# Patient Record
Sex: MALE | Race: WHITE | NOT HISPANIC OR LATINO | Employment: FULL TIME | URBAN - NONMETROPOLITAN AREA
[De-identification: names, ages, dates, MRNs, and addresses within clinical notes are randomized per-mention and may not be internally consistent; named-entity substitution may affect disease eponyms.]

---
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
## 2024-01-23 ENCOUNTER — OFFICE VISIT (OUTPATIENT)
Dept: URGENT CARE | Facility: PHYSICIAN GROUP | Age: 19
End: 2024-01-23
Payer: COMMERCIAL

## 2024-01-23 VITALS
TEMPERATURE: 98.3 F | HEART RATE: 58 BPM | BODY MASS INDEX: 20.44 KG/M2 | SYSTOLIC BLOOD PRESSURE: 112 MMHG | WEIGHT: 146 LBS | RESPIRATION RATE: 18 BRPM | HEIGHT: 71 IN | OXYGEN SATURATION: 100 % | DIASTOLIC BLOOD PRESSURE: 56 MMHG

## 2024-01-23 DIAGNOSIS — S09.90XA INJURY OF HEAD, INITIAL ENCOUNTER: ICD-10-CM

## 2024-01-23 DIAGNOSIS — R42 DIZZINESS: ICD-10-CM

## 2024-01-23 DIAGNOSIS — R11.2 NAUSEA AND VOMITING, UNSPECIFIED VOMITING TYPE: ICD-10-CM

## 2024-01-23 PROCEDURE — 99213 OFFICE O/P EST LOW 20 MIN: CPT | Performed by: PHYSICIAN ASSISTANT

## 2024-01-23 PROCEDURE — 3074F SYST BP LT 130 MM HG: CPT | Performed by: PHYSICIAN ASSISTANT

## 2024-01-23 PROCEDURE — 3078F DIAST BP <80 MM HG: CPT | Performed by: PHYSICIAN ASSISTANT

## 2024-01-23 ASSESSMENT — FIBROSIS 4 INDEX: FIB4 SCORE: .19

## 2024-01-23 ASSESSMENT — ENCOUNTER SYMPTOMS
VOMITING: 1
BLURRED VISION: 1
NECK PAIN: 1
HEADACHES: 1
BRUISES/BLEEDS EASILY: 0
NAUSEA: 1
NUMBER OF EPISODES OF EMESIS TODAY: 1
DIZZINESS: 1

## 2024-01-23 ASSESSMENT — VISUAL ACUITY: OU: 1

## 2024-01-23 NOTE — PROGRESS NOTES
"Subjective:   Anish Pascal  is a 18 y.o. male who presents for Emesis and Headache (Snowboarding on Saturday hit back of head, pain in neck and HA, dizziness and blurred vision. started vomiting today, refused ER. Ambulance for now. Mom will take him after work at 330pm )      Emesis  This is a new problem. The current episode started in the past 7 days. Associated symptoms include headaches, nausea, neck pain and vomiting.   Headache  Patient is 4 days status post head injury while snowboarding.  He reports going off a jump and landing flat on his back impacting back of head on the ground.  He states he is not sure if he had loss of consciousness but does not recall events very well.  Patient had significant pain to neck and head, floaters in vision and dizziness over the last 3 to 4 days.  This morning he awoke with nausea and vomiting as well.  He denies paresthesias.  Denies anesthesia or incontinence.  Denies use of blood thinning medications.    Review of Systems   HENT:  Negative for ear discharge.    Eyes:  Positive for blurred vision.   Gastrointestinal:  Positive for nausea and vomiting.   Musculoskeletal:  Positive for neck pain.   Neurological:  Positive for dizziness and headaches.   Endo/Heme/Allergies:  Does not bruise/bleed easily.       Allergies   Allergen Reactions    Nkda [No Known Drug Allergy]         Objective:   /56   Pulse (!) 58   Temp 36.8 °C (98.3 °F) (Temporal)   Resp 18   Ht 1.803 m (5' 11\")   Wt 66.2 kg (146 lb)   SpO2 100%   BMI 20.36 kg/m²     Physical Exam  Vitals and nursing note reviewed.   Constitutional:       General: He is not in acute distress.     Appearance: Normal appearance. He is well-developed. He is not diaphoretic.   HENT:      Head: Normocephalic.      Right Ear: Tympanic membrane and external ear normal.      Left Ear: Tympanic membrane and external ear normal.      Nose: Nose normal.   Eyes:      General: Vision grossly intact. No scleral icterus. "        Right eye: No discharge.         Left eye: No discharge.      Extraocular Movements: Extraocular movements intact.      Conjunctiva/sclera: Conjunctivae normal.      Pupils: Pupils are equal, round, and reactive to light.   Neck:      Trachea: Phonation normal.   Pulmonary:      Effort: Pulmonary effort is normal. No respiratory distress.   Musculoskeletal:      Cervical back: Neck supple. Pain with movement present. Decreased range of motion.   Skin:     General: Skin is warm and dry.      Coloration: Skin is not pale.   Neurological:      Mental Status: He is alert and oriented to person, place, and time.      Cranial Nerves: Cranial nerves 2-12 are intact.      Sensory: Sensation is intact.      Motor: Motor function is intact.      Coordination: Coordination is intact. Romberg sign negative (Slightly unsteady but able to complete test without moving feet). Coordination normal. Finger-Nose-Finger Test and Heel to Shin Test normal.      Gait: Gait is intact. Gait normal.         Assessment/Plan:   1. Injury of head, initial encounter    2. Nausea and vomiting, unspecified vomiting type    3. Dizziness    Patient has been directed to an ER for further management/work up now, today.  Based on patient's constellation of symptoms I do recommend ER evaluation now today.  Concern for intracranial process that is worsened today over the last 3 to 4 days following head injury.  Patient is recommended ER at the  prior to checking in, declined EMS transportation to the ER and states he will go to ER via POV with his mother as he is able today when she gets off of work.  I reviewed with him the risks of delayed care.    I have worn an N95 mask, gloves and eye protection for the entire encounter with this patient.     Differential diagnosis, natural history, supportive care, and indications for immediate follow-up discussed.

## 2024-02-10 ENCOUNTER — OFFICE VISIT (OUTPATIENT)
Dept: URGENT CARE | Facility: PHYSICIAN GROUP | Age: 19
End: 2024-02-10
Payer: COMMERCIAL

## 2024-02-10 VITALS
BODY MASS INDEX: 20.86 KG/M2 | HEIGHT: 71 IN | DIASTOLIC BLOOD PRESSURE: 78 MMHG | TEMPERATURE: 96.7 F | HEART RATE: 96 BPM | WEIGHT: 149 LBS | RESPIRATION RATE: 14 BRPM | SYSTOLIC BLOOD PRESSURE: 118 MMHG | OXYGEN SATURATION: 98 %

## 2024-02-10 DIAGNOSIS — H01.004 BLEPHARITIS OF LEFT UPPER EYELID, UNSPECIFIED TYPE: ICD-10-CM

## 2024-02-10 PROCEDURE — 3074F SYST BP LT 130 MM HG: CPT | Performed by: NURSE PRACTITIONER

## 2024-02-10 PROCEDURE — 3078F DIAST BP <80 MM HG: CPT | Performed by: NURSE PRACTITIONER

## 2024-02-10 PROCEDURE — 99213 OFFICE O/P EST LOW 20 MIN: CPT | Performed by: NURSE PRACTITIONER

## 2024-02-10 RX ORDER — ERYTHROMYCIN 5 MG/G
1 OINTMENT OPHTHALMIC 2 TIMES DAILY
Qty: 3.5 G | Refills: 0 | Status: SHIPPED | OUTPATIENT
Start: 2024-02-10 | End: 2024-02-17

## 2024-02-10 RX ORDER — IBUPROFEN 800 MG/1
800 TABLET ORAL 3 TIMES DAILY PRN
COMMUNITY
Start: 2024-01-24

## 2024-02-10 RX ORDER — ONDANSETRON 4 MG/1
4 TABLET, ORALLY DISINTEGRATING ORAL EVERY 8 HOURS PRN
COMMUNITY
Start: 2024-01-24

## 2024-02-10 RX ORDER — CYCLOBENZAPRINE HCL 10 MG
TABLET ORAL
COMMUNITY
Start: 2024-01-24

## 2024-02-10 ASSESSMENT — FIBROSIS 4 INDEX: FIB4 SCORE: .19

## 2024-02-11 ASSESSMENT — ENCOUNTER SYMPTOMS
EYE DISCHARGE: 0
DOUBLE VISION: 0
EYE PAIN: 1
EYE REDNESS: 1
BLURRED VISION: 0
PHOTOPHOBIA: 0

## 2024-02-11 NOTE — PROGRESS NOTES
"Subjective:     Anish Pascal is a 18 y.o. male who presents for Eye Swelling ((L) x 2 days, pt states it's very sore as well)      HPI  Pt presents for evaluation of a new problem.  Anish is a very pleasant 18-year-old male presents to urgent care today with complaints of left lower eyelid swelling, erythema and pain that started yesterday.  He denies any known injury, drainage or decreased vision.  He did apply a cold compress which did slightly improve his symptoms.    Review of Systems   Eyes:  Positive for pain and redness. Negative for blurred vision, double vision, photophobia and discharge.       PMH:   Past Medical History:   Diagnosis Date    ASTHMA      ALLERGIES:   Allergies   Allergen Reactions    Nkda [No Known Drug Allergy]      SURGHX: No past surgical history on file.  SOCHX:   Social History     Socioeconomic History    Marital status: Single   Tobacco Use    Smoking status: Never    Smokeless tobacco: Never   Substance and Sexual Activity    Alcohol use: No    Drug use: No     FH: No family history on file.      Objective:   /78   Pulse 96   Temp 35.9 °C (96.7 °F) (Temporal)   Resp 14   Ht 1.803 m (5' 11\")   Wt 67.6 kg (149 lb)   SpO2 98%   BMI 20.78 kg/m²     Physical Exam  Vitals and nursing note reviewed.   Constitutional:       General: He is not in acute distress.     Appearance: Normal appearance. He is normal weight. He is not ill-appearing or toxic-appearing.   HENT:      Head: Normocephalic.      Right Ear: External ear normal.      Left Ear: External ear normal.      Nose: Nose normal.      Mouth/Throat:      Mouth: Mucous membranes are moist.   Eyes:      General:         Right eye: No discharge.         Left eye: No discharge.      Extraocular Movements: Extraocular movements intact.      Conjunctiva/sclera: Conjunctivae normal.      Pupils: Pupils are equal, round, and reactive to light.        Comments: There is swelling and erythema present to lash line of left " lower eyelid.  Negative for active drainage or lesion.  Conjunctiva is normal.   Pulmonary:      Effort: Pulmonary effort is normal.      Breath sounds: Normal breath sounds.   Abdominal:      General: Abdomen is flat.   Musculoskeletal:         General: Normal range of motion.      Cervical back: Normal range of motion and neck supple. No rigidity.   Lymphadenopathy:      Cervical: No cervical adenopathy.   Skin:     General: Skin is warm and dry.   Neurological:      General: No focal deficit present.      Mental Status: He is alert and oriented to person, place, and time. Mental status is at baseline.   Psychiatric:         Mood and Affect: Mood normal.         Behavior: Behavior normal.         Judgment: Judgment normal.         Assessment/Plan:   Assessment    1. Blepharitis of left upper eyelid, unspecified type  erythromycin 5 MG/GM Ointment        Patient symptoms are consistent with blepharitis.  I did encourage warm compress and gentle cleansing with tear free baby shampoo twice daily followed with erythromycin ointment.  Patient to follow-up for worsening or persistent symptoms.  He is in agreement with plan of care today.

## 2024-04-08 ENCOUNTER — APPOINTMENT (OUTPATIENT)
Dept: RADIOLOGY | Facility: IMAGING CENTER | Age: 19
End: 2024-04-08
Payer: COMMERCIAL

## 2024-04-08 ENCOUNTER — OFFICE VISIT (OUTPATIENT)
Dept: URGENT CARE | Facility: PHYSICIAN GROUP | Age: 19
End: 2024-04-08
Payer: COMMERCIAL

## 2024-04-08 VITALS
HEIGHT: 72 IN | OXYGEN SATURATION: 97 % | TEMPERATURE: 97 F | HEART RATE: 91 BPM | RESPIRATION RATE: 20 BRPM | BODY MASS INDEX: 19.69 KG/M2 | WEIGHT: 145.4 LBS
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
## 2024-04-08 DIAGNOSIS — S41.152A DOG BITE OF LEFT UPPER EXTREMITY, INITIAL ENCOUNTER: ICD-10-CM

## 2024-04-08 PROCEDURE — 90715 TDAP VACCINE 7 YRS/> IM: CPT

## 2024-04-08 PROCEDURE — 99213 OFFICE O/P EST LOW 20 MIN: CPT | Mod: 25

## 2024-04-08 PROCEDURE — 90471 IMMUNIZATION ADMIN: CPT

## 2024-04-08 PROCEDURE — 73090 X-RAY EXAM OF FOREARM: CPT | Mod: TC,FY,LT

## 2024-04-08 RX ORDER — AMOXICILLIN AND CLAVULANATE POTASSIUM 875; 125 MG/1; MG/1
1 TABLET, FILM COATED ORAL 2 TIMES DAILY
Qty: 20 TABLET | Refills: 0 | Status: SHIPPED | OUTPATIENT
Start: 2024-04-08 | End: 2024-04-18

## 2024-04-08 RX ORDER — KETOROLAC TROMETHAMINE 30 MG/ML
15 INJECTION, SOLUTION INTRAMUSCULAR; INTRAVENOUS ONCE
Status: COMPLETED | OUTPATIENT
Start: 2024-04-08 | End: 2024-04-08

## 2024-04-08 RX ADMIN — KETOROLAC TROMETHAMINE 15 MG: 30 INJECTION, SOLUTION INTRAMUSCULAR; INTRAVENOUS at 18:27

## 2024-04-08 ASSESSMENT — FIBROSIS 4 INDEX: FIB4 SCORE: .19

## 2024-04-09 NOTE — PROGRESS NOTES
"Chief Complaint   Patient presents with    Dog Bite      1 hour ago left for arm          Subjective:   HISTORY OF PRESENT ILLNESS: Anish Pascal is a 18 y.o. male who presents for a dog bite to his left forearm.. His two pit bulls were fighting and he tried to break up the fight.  Pt having significant pain at the site when flexing or rotating his wrist . Dogs are uTD on vaccinations.   Medications, Allergies, current problem list, Social and Family history reviewed today in Epic.     Objective:     Pulse 91   Temp 36.1 °C (97 °F) (Temporal)   Resp 20   Ht 1.816 m (5' 11.5\")   Wt 66 kg (145 lb 6.4 oz)   SpO2 97%     Physical Exam  Vitals reviewed.   Constitutional:       Appearance: Normal appearance.   HENT:      Mouth/Throat:      Mouth: Mucous membranes are moist.   Cardiovascular:      Rate and Rhythm: Normal rate.   Pulmonary:      Effort: Pulmonary effort is normal.   Musculoskeletal:        Arms:       Comments: 2 bite wounds on posterior and anterior side of distal forearm.  He can flex and extend against resistant   Skin:     General: Skin is warm and dry.   Neurological:      Mental Status: He is alert and oriented to person, place, and time.   Psychiatric:         Mood and Affect: Mood normal.          Assessment/Plan:     Diagnosis and associated orders    I personally reviewed prior external notes and test results pertinent to today's visit.     1. Dog bite of left upper extremity, initial encounter  DX-FOREARM LEFT    ketorolac (Toradol) injection 15 mg        Today's radiology imaging personally reviewed by me today on day of visit and Radiology readings reviewed and discussed w/ patient today.     RADIOLOGY RESULTS   DX-FOREARM LEFT    Result Date: 4/8/2024 4/8/2024 6:04 PM HISTORY/REASON FOR EXAM:  Pain/Deformity Following Trauma; dog bite. TECHNIQUE/EXAM DESCRIPTION AND NUMBER OF VIEWS:  2 views of the  LEFT forearm. COMPARISON: None FINDINGS: MINERALIZATION: Mineralization is " unremarkable for age. INJURY: No acute fracture or gross malalignment is seen. JOINTS: No gross abnormality. Soft tissue gas of the forearm and wrist. No evidence of radiopaque foreign body.     1.  No acute fracture or dislocation. 2.  Soft tissue gas of the forearm and wrist. No evidence of radiopaque foreign body.             IMPRESSION:  Pt has stable vital signs and no red flag symptoms or exam findings identified.  He has bite wound to both anterior and posterior aspects of his forearm in the midline.  He can flex and extend against resistance which is re-assuring against a tendon injury.  Xray to r/o fracture due to his pain level, this is negative  His wounds were cleansed with chlorhexidine and placed in clean dressing.  Will start on Augmentin.  Er precautions for worsening conditions.   Differential diagnosis discussed. Pt was Educated on red flag symptoms. Pt has been Instructed to return to Urgent Care or nearest Emergency Department if symptoms fail to improve, for any change in condition, further concerns, or new concerning symptoms. Patient states understanding of the plan of care and discharge instructions.  They are discharged in stable condition.         Please note that this dictation was created using voice recognition software. I have made a reasonable attempt to correct obvious errors, but I expect that there are errors of grammar and possibly content that I did not discover before finalizing the note.    This note was electronically signed by PETTY Lara

## 2024-10-20 ENCOUNTER — HOSPITAL ENCOUNTER (OUTPATIENT)
Facility: MEDICAL CENTER | Age: 19
End: 2024-10-20
Attending: NURSE PRACTITIONER
Payer: COMMERCIAL

## 2024-10-20 ENCOUNTER — OFFICE VISIT (OUTPATIENT)
Dept: URGENT CARE | Facility: PHYSICIAN GROUP | Age: 19
End: 2024-10-20
Payer: COMMERCIAL

## 2024-10-20 VITALS
RESPIRATION RATE: 18 BRPM | TEMPERATURE: 97.8 F | WEIGHT: 138 LBS | HEART RATE: 76 BPM | OXYGEN SATURATION: 97 % | DIASTOLIC BLOOD PRESSURE: 60 MMHG | SYSTOLIC BLOOD PRESSURE: 110 MMHG | HEIGHT: 71 IN | BODY MASS INDEX: 19.32 KG/M2

## 2024-10-20 DIAGNOSIS — N48.9 PENILE LESION: ICD-10-CM

## 2024-10-20 DIAGNOSIS — Z20.2 STD EXPOSURE: ICD-10-CM

## 2024-10-20 PROCEDURE — 87798 DETECT AGENT NOS DNA AMP: CPT | Mod: 91

## 2024-10-20 PROCEDURE — 87563 M. GENITALIUM AMP PROBE: CPT

## 2024-10-20 PROCEDURE — 3078F DIAST BP <80 MM HG: CPT | Performed by: NURSE PRACTITIONER

## 2024-10-20 PROCEDURE — 3074F SYST BP LT 130 MM HG: CPT | Performed by: NURSE PRACTITIONER

## 2024-10-20 PROCEDURE — 99214 OFFICE O/P EST MOD 30 MIN: CPT | Performed by: NURSE PRACTITIONER

## 2024-10-20 PROCEDURE — 87491 CHLMYD TRACH DNA AMP PROBE: CPT

## 2024-10-20 PROCEDURE — 87591 N.GONORRHOEAE DNA AMP PROB: CPT

## 2024-10-20 RX ORDER — DOXYCYCLINE HYCLATE 100 MG
100 TABLET ORAL 2 TIMES DAILY
Qty: 14 TABLET | Refills: 0 | Status: SHIPPED | OUTPATIENT
Start: 2024-10-20 | End: 2024-10-27

## 2024-10-21 DIAGNOSIS — Z20.2 STD EXPOSURE: ICD-10-CM

## 2024-10-24 LAB
M GENITALIUM DNA SPEC QL NAA+PROBE: NOT DETECTED
M HOMINIS DNA SPEC QL NAA+PROBE: NOT DETECTED
SPECIMEN SOURCE: ABNORMAL
U PARVUM DNA SPEC QL NAA+PROBE: NOT DETECTED
U UREALYTICUM DNA SPEC QL NAA+PROBE: DETECTED